# Patient Record
Sex: FEMALE | Race: WHITE | NOT HISPANIC OR LATINO
[De-identification: names, ages, dates, MRNs, and addresses within clinical notes are randomized per-mention and may not be internally consistent; named-entity substitution may affect disease eponyms.]

---

## 2020-10-18 ENCOUNTER — FORM ENCOUNTER (OUTPATIENT)
Age: 50
End: 2020-10-18

## 2021-08-24 PROBLEM — Z00.00 ENCOUNTER FOR PREVENTIVE HEALTH EXAMINATION: Status: ACTIVE | Noted: 2021-08-24

## 2021-09-13 DIAGNOSIS — Z78.9 OTHER SPECIFIED HEALTH STATUS: ICD-10-CM

## 2021-09-13 DIAGNOSIS — Z86.79 PERSONAL HISTORY OF OTHER DISEASES OF THE CIRCULATORY SYSTEM: ICD-10-CM

## 2021-09-13 DIAGNOSIS — Z80.1 FAMILY HISTORY OF MALIGNANT NEOPLASM OF TRACHEA, BRONCHUS AND LUNG: ICD-10-CM

## 2021-09-13 DIAGNOSIS — Z85.3 PERSONAL HISTORY OF MALIGNANT NEOPLASM OF BREAST: ICD-10-CM

## 2021-09-13 DIAGNOSIS — Z80.3 FAMILY HISTORY OF MALIGNANT NEOPLASM OF BREAST: ICD-10-CM

## 2021-09-14 ENCOUNTER — NON-APPOINTMENT (OUTPATIENT)
Age: 51
End: 2021-09-14

## 2021-09-14 ENCOUNTER — APPOINTMENT (OUTPATIENT)
Dept: BREAST CENTER | Facility: CLINIC | Age: 51
End: 2021-09-14
Payer: COMMERCIAL

## 2021-09-14 VITALS
HEIGHT: 67 IN | DIASTOLIC BLOOD PRESSURE: 87 MMHG | HEART RATE: 70 BPM | OXYGEN SATURATION: 98 % | SYSTOLIC BLOOD PRESSURE: 124 MMHG | BODY MASS INDEX: 26.68 KG/M2 | WEIGHT: 170 LBS

## 2021-09-14 PROCEDURE — 99213 OFFICE O/P EST LOW 20 MIN: CPT

## 2021-09-14 NOTE — ASSESSMENT
[FreeTextEntry1] : The patient is a 51-year-old  postmenopausal white female.  She underwent menarche at age 10 and had her first child at age 37.  She has a strong family history with her mother who had postmenopausal breast cancer as well as a maternal grandmother with postmenopausal breast cancer and a maternal aunt with premenopausal breast cancer.  The patient's sister underwent bilateral prophylactic mastectomies due to her high risk.  The patient herself was found to have calcifications in the 12:00 region of the right breast in  and stereotactic biopsy showed intermediate to high-grade DCIS which was ER/[place] positive.  Genetic testing in  was negative.  She decided undergo bilateral mastectomies and underwent a right breast total mastectomy and sentinel lymph node biopsy and left breast nipple sparing mastectomy with direct implant reconstruction on 2018.  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  The left side was benign.  She had 2 negative right axillary sentinel lymph nodes and 2 negative left axillary sentinel lymph nodes with 2 negative left axillary nonsentinel lymph nodes.  The right breast invasive cancer was ER borderline at 5%, [place] positive at 20%, and HER-2 positive by IHC making this a pathologic prognostic stage IA breast cancer.  She underwent chemotherapy through Dr. Toro with Herceptin.  She underwent a nipple reconstruction on the right.  She did well until she felt an area of recurrence in the lateral upper outer aspect of the right breast.  She underwent a chest wall excision of this recurrence on December 10, 2012 and had removal and exchange of the implant.  This recurrence was a 1.6 cm poorly differentiated invasive duct cancer which was ER/[place] strongly positive and HER-2 positive by IHC as well as FISH making this a recurrent pathologic prognostic stage IA breast cancer.  Metastatic survey showed no distant disease.  She underwent radiation over the right implant and did get chemotherapy again with AC followed by .  She developed significant capsular contracture over the right implant which was affecting her range of motion and underwent a capsulectomy with exchange of the implant on Constance 15, 2015 by Dr. Landers but developed an infection postoperatively and had her implant removed over the  weekend in .  She was on Zoladex and Arimidex and was followed by Dr. Gaffney but now sees a medical oncologist at [facility] and was taken off of Zoladex.  She underwent [facility] genetic panel testing in 2016 which was negative.  She did have a pigmented skin lesion on the right lateral chest wall which I biopsied in 2018 which is showed seborrheic keratosis and benign ductal epithelial cells.  On exam, she has significant right lateral chest wall and axillary scarring but no evidence of recurrence.  She should follow-up again in 1 year in 2022 and continues to follow-up at [facility] and remains on Arimidex.

## 2021-09-14 NOTE — PHYSICAL EXAM
[Normocephalic] : normocephalic [Atraumatic] : atraumatic [EOMI] : extra ocular movement intact [Supple] : supple [No Supraclavicular Adenopathy] : no supraclavicular adenopathy [No Cervical Adenopathy] : no cervical adenopathy [Examined in the supine and seated position] : examined in the supine and seated position [No dominant masses] : no dominant masses in right breast  [No dominant masses] : no dominant masses left breast [No Nipple Retraction] : no left nipple retraction [No Nipple Discharge] : no left nipple discharge [Breast Mass Right Breast ___cm] : no masses [Breast Nipple Inversion Left] : nipple not inverted [Breast Nipple Retraction Left] : nipple not retracted [Breast Nipple Flattening Left] : nipple not flattened [Breast Nipple Fissures Left] : nipple not fissured [Breast Abnormal Secretion Bloody Fluid Left] : no bloody discharge [Breast Abnormal Lactation (Galactorrhea) Left] : no galactorrhea [Breast Abnormal Secretion Serous Fluid Left] : no serous discharge [Breast Abnormal Secretion Opalescent Fluid Left] : no milky discharge [Breast Mass Left Breast ___cm] : no masses [No Axillary Lymphadenopathy] : no left axillary lymphadenopathy [No Edema] : no edema [No Rashes] : no rashes [No Ulceration] : no ulceration [de-identified] : On exam, the patient has the obvious bilateral mastectomies with no reconstruction on the right side and significant scarring towards her right axilla and lateral aspect of the right chest wall.  She has no suspicious findings over the left implant.  She has no axillary, supraclavicular, or cervical adenopathy.  She has no sensation in left nipple areolar complex.  She has good sensation on the superior, medial, and lateral poles of the left breast. [de-identified] : Status post mastectomy with previous implant which was removed secondary to contracture with significant scarring laterally and towards the axillary tail.  No evidence of recurrence. [de-identified] : Status post nipple sparing mastectomy with implant reconstruction with no suspicious findings. [de-identified] : Significant right axillary scarring

## 2021-09-14 NOTE — REASON FOR VISIT
[Follow-Up: _____] : a [unfilled] follow-up visit [FreeTextEntry1] : The patient comes in with a strong family history of breast cancer and a personal history of a right breast cancer diagnosed in 2008 which was a 1 cm poorly differentiated invasive duct cancer with multifocal DCIS for which she underwent a right breast total mastectomy and left breast prophylactic nipple sparing mastectomy with direct implant reconstruction and bilateral sentinel lymph node biopsies on April 16, 2008.  She ended up with 2 negative right axillary sentinel lymph nodes and 2 negative left axillary sentinel lymph nodes with 2 negative left axillary nonsentinel lymph nodes.  The right breast cancer was ER/[place] HER-2 positive (pathologic prognostic stage IA) and she underwent chemotherapy with Herceptin.  She later developed a recurrence over the right implant in 2012 and underwent a chest wall excision with exchange of the implant for 1.6 cm poorly differentiated invasive duct cancer again ER/IA HER-2 positive and she had radiation over the right implant and got chemotherapy with AC followed by .  She developed a significant capsular contracture over the right implant eventually requiring removal in 2015.  [facility] genetic panel testing was negative in 2016.  She has remained on Arimidex and now follows up at [facility] with medical oncology.  She comes in for routine yearly follow-up.

## 2021-09-14 NOTE — HISTORY OF PRESENT ILLNESS
[FreeTextEntry1] : The patient is a 51-year-old  postmenopausal white female.  She underwent menarche at age 10 and had her first child at age 37.  She has a strong family history with her mother who had postmenopausal breast cancer as well as a maternal grandmother with postmenopausal breast cancer and a maternal aunt with premenopausal breast cancer.  The patient's sister underwent bilateral prophylactic mastectomies due to her high risk.  The patient herself was found to have calcifications in the 12:00 region of the right breast in  and stereotactic biopsy showed intermediate to high-grade DCIS which was ER/NM positive.  Genetic testing in  was negative.  She decided undergo bilateral mastectomies and underwent a right breast total mastectomy and sentinel lymph node biopsy and left breast nipple sparing mastectomy with direct implant reconstruction on 2018.  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  The left side was benign.  She had 2 negative right axillary sentinel lymph nodes and 2 negative left axillary sentinel lymph nodes with 2 negative left axillary nonsentinel lymph nodes.  The right breast invasive cancer was ER borderline at 5%, NM positive at 20%, and HER-2 positive by IHC making this a pathologic prognostic stage IA breast cancer.  She underwent chemotherapy through Dr. Toro with Herceptin.  She underwent a nipple reconstruction on the right.  She did well until she felt an area of recurrence in the lateral upper outer aspect of the right breast.  She underwent a chest wall excision of this recurrence on December 10, 2012 and had removal and exchange of the implant.  This recurrence was a 1.6 cm poorly differentiated invasive duct cancer which was ER/NM strongly positive and HER-2 positive by IHC as well as FISH making this a recurrent pathologic prognostic stage IA breast cancer.  Metastatic survey showed no distant disease.  She underwent radiation over the right implant and did get chemotherapy again with AC followed by .  She developed significant capsular contracture over the right implant which was affecting her range of motion and underwent a capsulectomy with exchange of the implant on Constance 15, 2015 by Dr. Landers but developed an infection postoperatively and had her implant removed over the  weekend in .  She was on Zoladex and Arimidex and was followed by Dr. Gaffney but now sees a medical oncologist at [facility] and was taken off of Zoladex.  She underwent [facility] genetic panel testing in 2016 which was negative.  She did have a pigmented skin lesion on the right lateral chest wall which I biopsied in 2018 which is showed seborrheic keratosis and benign ductal epithelial cells.  She comes in now for routine yearly follow-up.

## 2022-09-20 NOTE — ASSESSMENT
[FreeTextEntry1] : The patient is a 52-year-old  postmenopausal white female.  She underwent menarche at age 10 and had her first child at age 37.  She has a strong family history with her mother who had postmenopausal breast cancer as well as a maternal grandmother with postmenopausal breast cancer and a maternal aunt with premenopausal breast cancer.  The patient's sister underwent bilateral prophylactic mastectomies due to her high risk.  The patient herself was found to have calcifications in the 12:00 region of the right breast in  and stereotactic biopsy showed intermediate to high-grade DCIS which was ER/[place] positive.  Genetic testing in  was negative.  She decided undergo bilateral mastectomies and underwent a right breast total mastectomy and sentinel lymph node biopsy and left breast nipple sparing mastectomy with direct to implant reconstruction on 2018.  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  The left side was benign.  She had 2 negative right axillary sentinel lymph nodes and 2 negative left axillary sentinel lymph nodes with 2 negative left axillary nonsentinel lymph nodes.  The right breast invasive cancer was ER borderline at 5%, [place] positive at 20%, and HER-2 positive by IHC making this a pathologic prognostic stage IA breast cancer.  She underwent chemotherapy through Dr. Toro with Herceptin.  She underwent a nipple reconstruction on the right.  She did well until she felt an area of recurrence in the lateral upper outer aspect of the right breast.  She underwent a chest wall excision of this recurrence on December 10, 2012 and had removal and exchange of the implant.  This recurrence was a 1.6 cm poorly differentiated invasive duct cancer which was ER/[place] strongly positive and HER-2 positive by IHC as well as FISH making this a recurrent pathologic prognostic stage IA breast cancer.  Metastatic survey showed no distant disease.  She underwent radiation over the right implant and did get chemotherapy again with AC followed by .  She developed significant capsular contracture over the right implant which was affecting her range of motion and underwent a capsulectomy with exchange of the implant on Constance 15, 2015 by Dr. Landers but developed an infection postoperatively and had her implant removed over the  weekend in .  She was on Zoladex and Arimidex and was followed by Dr. Gaffney but now sees a medical oncologist at [facility] and was taken off of Zoladex.  She underwent [facility] genetic panel testing in 2016 which was negative.  She did have a pigmented skin lesion on the right lateral chest wall which I biopsied in 2018 which is showed seborrheic keratosis and benign ductal epithelial cells.  On exam, she has significant right lateral chest wall and axillary scarring but no evidence of recurrence.  She should follow-up again in 1 year in 2023 and continues to follow-up at [facility] and remains on Arimidex.

## 2022-09-20 NOTE — PAST MEDICAL HISTORY
[Postmenopausal] : The patient is postmenopausal [Menarche Age ____] : age at menarche was [unfilled] [Menopause Age____] : age at menopause was [unfilled] [History of Hormone Replacement Treatment] : has no history of hormone replacement treatment [Total Preg ___] : G[unfilled] [Live Births ___] : P[unfilled]  [Premature ___] : Premature: [unfilled] [Age At Live Birth ___] : Age at live birth: [unfilled]

## 2022-09-20 NOTE — HISTORY OF PRESENT ILLNESS
[FreeTextEntry1] : The patient is a 52-year-old  postmenopausal white female.  She underwent menarche at age 10 and had her first child at age 37.  She has a strong family history with her mother who had postmenopausal breast cancer as well as a maternal grandmother with postmenopausal breast cancer and a maternal aunt with premenopausal breast cancer.  The patient's sister underwent bilateral prophylactic mastectomies due to her high risk.  The patient herself was found to have calcifications in the 12:00 region of the right breast in  and stereotactic biopsy showed intermediate to high-grade DCIS which was ER/NM positive.  Genetic testing in  was negative.  She decided undergo bilateral mastectomies and underwent a right breast total mastectomy and sentinel lymph node biopsy and left breast nipple sparing mastectomy with direct to implant reconstruction on 2018.  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  The left side was benign.  She had 2 negative right axillary sentinel lymph nodes and 2 negative left axillary sentinel lymph nodes with 2 negative left axillary nonsentinel lymph nodes.  The right breast invasive cancer was ER borderline at 5%, NM positive at 20%, and HER-2 positive by IHC making this a pathologic prognostic stage IA breast cancer.  She underwent chemotherapy through Dr. Toro with Herceptin.  She underwent a nipple reconstruction on the right.  She did well until she felt an area of recurrence in the lateral upper outer aspect of the right breast.  She underwent a chest wall excision of this recurrence on December 10, 2012 and had removal and exchange of the implant.  This recurrence was a 1.6 cm poorly differentiated invasive duct cancer which was ER/NM strongly positive and HER-2 positive by IHC as well as FISH making this a recurrent pathologic prognostic stage IA breast cancer.  Metastatic survey showed no distant disease.  She underwent radiation over the right implant and did get chemotherapy again with AC followed by .  She developed significant capsular contracture over the right implant which was affecting her range of motion and underwent a capsulectomy with exchange of the implant on Constance 15, 2015 by Dr. Landers but developed an infection postoperatively and had her implant removed over the  weekend in .  She was on Zoladex and Arimidex and was followed by Dr. Gaffney but now sees a medical oncologist at [facility] and was taken off of Zoladex.  She underwent [facility] genetic panel testing in 2016 which was negative.  She did have a pigmented skin lesion on the right lateral chest wall which I biopsied in 2018 which is showed seborrheic keratosis and benign ductal epithelial cells.  She comes in now for routine yearly follow-up.

## 2022-09-20 NOTE — REASON FOR VISIT
[Follow-Up: _____] : a [unfilled] follow-up visit [FreeTextEntry1] : The patient comes in with a strong family history of breast cancer and a personal history of a right breast cancer diagnosed in 2008 which was a 1 cm poorly differentiated invasive duct cancer with multifocal DCIS for which she underwent a right breast total mastectomy and left breast prophylactic nipple sparing mastectomy with direct implant reconstruction and bilateral sentinel lymph node biopsies on April 16, 2008.  She ended up with 2 negative right axillary sentinel lymph nodes and 2 negative left axillary sentinel lymph nodes with 2 negative left axillary nonsentinel lymph nodes.  The right breast cancer was ER/[place] HER-2 positive (pathologic prognostic stage IA) and she underwent chemotherapy with Herceptin.  She later developed a recurrence over the right implant in 2012 and underwent a chest wall excision with exchange of the implant for 1.6 cm poorly differentiated invasive duct cancer again ER/OK HER-2 positive and she had radiation over the right implant and got chemotherapy with AC followed by .  She developed a significant capsular contracture over the right implant eventually requiring removal in 2015.  [facility] genetic panel testing was negative in 2016.  She has remained on Arimidex and now follows up at [facility] with medical oncology.  She comes in for routine yearly follow-up.

## 2022-09-20 NOTE — PHYSICAL EXAM
[Normocephalic] : normocephalic [Atraumatic] : atraumatic [EOMI] : extra ocular movement intact [Supple] : supple [No Supraclavicular Adenopathy] : no supraclavicular adenopathy [No Cervical Adenopathy] : no cervical adenopathy [Examined in the supine and seated position] : examined in the supine and seated position [No dominant masses] : no dominant masses in right breast  [No dominant masses] : no dominant masses left breast [No Nipple Retraction] : no left nipple retraction [No Nipple Discharge] : no left nipple discharge [Breast Mass Right Breast ___cm] : no masses [Breast Nipple Inversion Left] : nipple not inverted [Breast Nipple Retraction Left] : nipple not retracted [Breast Nipple Flattening Left] : nipple not flattened [Breast Nipple Fissures Left] : nipple not fissured [Breast Abnormal Lactation (Galactorrhea) Left] : no galactorrhea [Breast Abnormal Secretion Bloody Fluid Left] : no bloody discharge [Breast Abnormal Secretion Serous Fluid Left] : no serous discharge [Breast Abnormal Secretion Opalescent Fluid Left] : no milky discharge [Breast Mass Left Breast ___cm] : no masses [No Axillary Lymphadenopathy] : no left axillary lymphadenopathy [No Edema] : no edema [No Rashes] : no rashes [No Ulceration] : no ulceration [de-identified] : On exam, the patient has the obvious bilateral mastectomies with no reconstruction on the right side and significant scarring towards her right axilla and lateral aspect of the right chest wall.  She has no suspicious findings over the left implant.  She has no axillary, supraclavicular, or cervical adenopathy.  She has no sensation in left nipple areolar complex.  She has good sensation on the superior, medial, and lateral poles of the left breast. [de-identified] : Status post mastectomy with previous implant which was removed secondary to contracture with significant scarring laterally and towards the axillary tail.  No evidence of recurrence. [de-identified] : Status post nipple sparing mastectomy with implant reconstruction with no suspicious findings. [de-identified] : Significant right axillary scarring

## 2022-09-27 ENCOUNTER — APPOINTMENT (OUTPATIENT)
Dept: BREAST CENTER | Facility: CLINIC | Age: 52
End: 2022-09-27

## 2022-10-05 NOTE — PHYSICAL EXAM
[Normocephalic] : normocephalic [Atraumatic] : atraumatic [EOMI] : extra ocular movement intact [Supple] : supple [No Supraclavicular Adenopathy] : no supraclavicular adenopathy [No Cervical Adenopathy] : no cervical adenopathy [Examined in the supine and seated position] : examined in the supine and seated position [No dominant masses] : no dominant masses in right breast  [No dominant masses] : no dominant masses left breast [No Nipple Retraction] : no left nipple retraction [No Nipple Discharge] : no left nipple discharge [Breast Mass Right Breast ___cm] : no masses [Breast Nipple Inversion Left] : nipple not inverted [Breast Nipple Retraction Left] : nipple not retracted [Breast Nipple Flattening Left] : nipple not flattened [Breast Nipple Fissures Left] : nipple not fissured [Breast Abnormal Lactation (Galactorrhea) Left] : no galactorrhea [Breast Abnormal Secretion Bloody Fluid Left] : no bloody discharge [Breast Abnormal Secretion Serous Fluid Left] : no serous discharge [Breast Abnormal Secretion Opalescent Fluid Left] : no milky discharge [Breast Mass Left Breast ___cm] : no masses [No Axillary Lymphadenopathy] : no left axillary lymphadenopathy [No Edema] : no edema [No Rashes] : no rashes [No Ulceration] : no ulceration [de-identified] : On exam, the patient has the obvious bilateral mastectomies with no reconstruction on the right side and significant scarring towards her right axilla and lateral aspect of the right chest wall.  She has no suspicious findings over the left implant.  She has no axillary, supraclavicular, or cervical adenopathy.  She has no sensation in left nipple areolar complex.  She has good sensation on the superior, medial, and lateral poles of the left breast. [de-identified] : Status post mastectomy with previous implant which was removed secondary to contracture with significant scarring laterally and towards the axillary tail.  No evidence of recurrence. [de-identified] : Status post nipple sparing mastectomy with implant reconstruction with no suspicious findings. [de-identified] : Significant right axillary scarring

## 2022-10-05 NOTE — REASON FOR VISIT
[Follow-Up: _____] : a [unfilled] follow-up visit [FreeTextEntry1] : The patient comes in with a strong family history of breast cancer and a personal history of a right breast cancer diagnosed in 2008 which was a 1 cm poorly differentiated invasive duct cancer with multifocal DCIS for which she underwent a right breast total mastectomy and left breast prophylactic nipple sparing mastectomy with direct implant reconstruction and bilateral sentinel lymph node biopsies on April 16, 2008.  She ended up with 2 negative right axillary sentinel lymph nodes and 2 negative left axillary sentinel lymph nodes with 2 negative left axillary nonsentinel lymph nodes.  The right breast cancer was ER/[place] HER-2 positive (pathologic prognostic stage IA) and she underwent chemotherapy with Herceptin.  She later developed a recurrence over the right implant in 2012 and underwent a chest wall excision with exchange of the implant for 1.6 cm poorly differentiated invasive duct cancer again ER/[place] HER-2 positive and she had radiation over the right implant and got chemotherapy with AC followed by .  She developed a significant capsular contracture over the right implant eventually requiring removal in 2015.  [facility] genetic panel testing was negative in 2016.  She has remained on Arimidex and now follows up at [facility] with medical oncology.  She comes in for routine yearly follow-up.

## 2022-10-05 NOTE — ASSESSMENT
[FreeTextEntry1] : The patient is a 52-year-old  postmenopausal white female.  She underwent menarche at age 10 and had her first child at age 37.  She has a strong family history with her mother who had postmenopausal breast cancer as well as a maternal grandmother with postmenopausal breast cancer and a maternal aunt with premenopausal breast cancer.  The patient's sister underwent bilateral prophylactic mastectomies due to her high risk.  The patient herself was found to have calcifications in the 12:00 region of the right breast in  and stereotactic biopsy showed intermediate to high-grade DCIS which was ER/CT positive.  Genetic testing in  was negative.  She decided undergo bilateral mastectomies and underwent a right breast total mastectomy and sentinel lymph node biopsy and left breast nipple sparing mastectomy with direct to implant reconstruction on 2008.  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  The left side was benign.  She had 2 negative right axillary sentinel lymph nodes and 2 negative left axillary sentinel lymph nodes with 2 negative left axillary nonsentinel lymph nodes.  The right breast invasive cancer was ER borderline at 5%, CT positive at 20%, and HER-2 positive by IHC making this a pathologic prognostic stage IA breast cancer.  She underwent chemotherapy through Dr. Toro with Herceptin.  She underwent a nipple reconstruction on the right.  She did well until she felt an area of recurrence in the lateral upper outer aspect of the right breast.  She underwent a chest wall excision of this recurrence on December 10, 2012 and had removal and exchange of the implant.  This recurrence was a 1.6 cm poorly differentiated invasive duct cancer which was ER/CT strongly positive and HER-2 positive by IHC as well as FISH making this a recurrent pathologic prognostic stage IA breast cancer.  Metastatic survey showed no distant disease.  She underwent radiation over the right implant and did get chemotherapy again with AC followed by .  She developed significant capsular contracture over the right implant which was affecting her range of motion and underwent a capsulectomy with exchange of the implant on [place] 15, 2015 by Dr. Landers but developed an infection postoperatively and had her implant removed over the  weekend in .  She was on Zoladex and Arimidex and was followed by Dr. Gaffney but now sees a medical oncologist at [facility] and was taken off of Zoladex.  She underwent [facility] genetic panel testing in 2016 which was negative.  She did have a pigmented skin lesion on the right lateral chest wall which I biopsied in 2018 which showed a seborrheic keratosis and benign ductal epithelial cells.  On exam, she has significant right lateral chest wall and axillary scarring but no evidence of recurrence.  She should follow-up again in 1 year in 2023 and continues to follow-up at [facility] and remains on Arimidex.

## 2022-10-05 NOTE — HISTORY OF PRESENT ILLNESS
[FreeTextEntry1] : The patient is a 52-year-old  postmenopausal white female.  She underwent menarche at age 10 and had her first child at age 37.  She has a strong family history with her mother who had postmenopausal breast cancer as well as a maternal grandmother with postmenopausal breast cancer and a maternal aunt with premenopausal breast cancer.  The patient's sister underwent bilateral prophylactic mastectomies due to her high risk.  The patient herself was found to have calcifications in the 12:00 region of the right breast in  and stereotactic biopsy showed intermediate to high-grade DCIS which was ER/MA positive.  Genetic testing in  was negative.  She decided undergo bilateral mastectomies and underwent a right breast total mastectomy and sentinel lymph node biopsy and left breast nipple sparing mastectomy with direct to implant reconstruction on 2008.  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  The left side was benign.  She had 2 negative right axillary sentinel lymph nodes and 2 negative left axillary sentinel lymph nodes with 2 negative left axillary nonsentinel lymph nodes.  The right breast invasive cancer was ER borderline at 5%, MA positive at 20%, and HER-2 positive by IHC making this a pathologic prognostic stage IA breast cancer.  She underwent chemotherapy through Dr. Toro with Herceptin.  She underwent a nipple reconstruction on the right.  She did well until she felt an area of recurrence in the lateral upper outer aspect of the right breast.  She underwent a chest wall excision of this recurrence on December 10, 2012 and had removal and exchange of the implant.  This recurrence was a 1.6 cm poorly differentiated invasive duct cancer which was ER/MA strongly positive and HER-2 positive by IHC as well as FISH making this a recurrent pathologic prognostic stage IA breast cancer.  Metastatic survey showed no distant disease.  She underwent radiation over the right implant and did get chemotherapy again with AC followed by .  She developed significant capsular contracture over the right implant which was affecting her range of motion and underwent a capsulectomy with exchange of the implant on Con[place] 15, 2015 by Dr. Landers but developed an infection postoperatively and had her implant removed over the  weekend in .  She was on Zoladex and Arimidex and was followed by Dr. Gaffney but now sees a medical oncologist at [facility] and was taken off of Zoladex.  She underwent [facility] genetic panel testing in 2016 which was negative.  She did have a pigmented skin lesion on the right lateral chest wall which I biopsied in 2018 which is showed seborrheic keratosis and benign ductal epithelial cells.  She comes in now for routine yearly follow-up.

## 2022-10-12 ENCOUNTER — APPOINTMENT (OUTPATIENT)
Dept: BREAST CENTER | Facility: CLINIC | Age: 52
End: 2022-10-12

## 2022-11-01 NOTE — REASON FOR VISIT
[Follow-Up: _____] : a [unfilled] follow-up visit [FreeTextEntry1] : The patient comes in with a strong family history of breast cancer and a personal history of a right breast cancer diagnosed in 2008 which was a 1 cm poorly differentiated invasive duct cancer with multifocal DCIS for which she underwent a right breast total mastectomy and left breast prophylactic nipple sparing mastectomy with direct implant reconstruction and bilateral sentinel lymph node biopsies on April 16, 2008.  She ended up with 2 negative right axillary sentinel lymph nodes and 2 negative left axillary sentinel lymph nodes with 2 negative left axillary nonsentinel lymph nodes.  The right breast cancer was ER/TX HER-2 positive (pathologic prognostic stage IA) and she underwent chemotherapy with Herceptin.  She later developed a recurrence over the right implant in 2012 and underwent a chest wall excision with exchange of the implant for 1.6 cm poorly differentiated invasive duct cancer again ER/TX HER-2 positive and she had radiation over the right implant and got chemotherapy with AC followed by .  She developed a significant capsular contracture over the right implant eventually requiring removal in 2015.  [facility] genetic panel testing was negative in 2016.  She has remained on Arimidex and now follows up at [facility] with medical oncology.  She comes in for routine yearly follow-up.

## 2022-11-01 NOTE — ASSESSMENT
[FreeTextEntry1] : The patient is a 52-year-old  postmenopausal white female.  She underwent menarche at age 10 and had her first child at age 37.  She has a strong family history with her mother who had postmenopausal breast cancer as well as a maternal grandmother with postmenopausal breast cancer and a maternal aunt with premenopausal breast cancer.  The patient's sister underwent bilateral prophylactic mastectomies due to her high risk.  The patient herself was found to have calcifications in the 12:00 region of the right breast in  and stereotactic biopsy showed intermediate to high-grade DCIS which was ER/TN positive.  Genetic testing in  was negative.  She decided undergo bilateral mastectomies and underwent a right breast total mastectomy and sentinel lymph node biopsy and left breast nipple sparing mastectomy with direct to implant reconstruction on 2008.  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  The left side was benign.  She had 2 negative right axillary sentinel lymph nodes and 2 negative left axillary sentinel lymph nodes with 2 negative left axillary nonsentinel lymph nodes.  The right breast invasive cancer was ER borderline at 5%, TN positive at 20%, and HER-2 positive by IHC making this a pathologic prognostic stage IA breast cancer.  She underwent chemotherapy through Dr. Toro with Herceptin.  She underwent a nipple reconstruction on the right.  She did well until she felt an area of recurrence in the lateral upper outer aspect of the right breast.  She underwent a chest wall excision of this recurrence on December 10, 2012 and had removal and exchange of the implant.  This recurrence was a 1.6 cm poorly differentiated invasive duct cancer which was ER/TN strongly positive and HER-2 positive by IHC as well as FISH making this a recurrent pathologic prognostic stage IA breast cancer.  Metastatic survey showed no distant disease.  She underwent radiation over the right implant and did get chemotherapy again with AC followed by .  She developed significant capsular contracture over the right implant which was affecting her range of motion and underwent a capsulectomy with exchange of the implant on [place] 15, 2015 by Dr. Landers but developed an infection postoperatively and had her implant removed over the  weekend in .  She was on Zoladex and Arimidex and was followed by Dr. Gaffney but now sees a medical oncologist at [facility] and was taken off of Zoladex.  She underwent [facility] genetic panel testing in 2016 which was negative.  She did have a pigmented skin lesion on the right lateral chest wall which I biopsied in 2018 which showed a seborrheic keratosis and benign ductal epithelial cells.  On exam, she has significant right lateral chest wall and axillary scarring but no evidence of recurrence.  She should follow-up again in 1 year in 2023 and continues to follow-up at [facility] and remains on Arimidex.

## 2022-11-01 NOTE — PHYSICAL EXAM
[Normocephalic] : normocephalic [Atraumatic] : atraumatic [EOMI] : extra ocular movement intact [Supple] : supple [No Supraclavicular Adenopathy] : no supraclavicular adenopathy [No Cervical Adenopathy] : no cervical adenopathy [Examined in the supine and seated position] : examined in the supine and seated position [No dominant masses] : no dominant masses in right breast  [No dominant masses] : no dominant masses left breast [No Nipple Retraction] : no left nipple retraction [No Nipple Discharge] : no left nipple discharge [Breast Mass Right Breast ___cm] : no masses [Breast Nipple Inversion Left] : nipple not inverted [Breast Nipple Retraction Left] : nipple not retracted [Breast Nipple Flattening Left] : nipple not flattened [Breast Abnormal Lactation (Galactorrhea) Left] : no galactorrhea [Breast Nipple Fissures Left] : nipple not fissured [Breast Abnormal Secretion Serous Fluid Left] : no serous discharge [Breast Abnormal Secretion Bloody Fluid Left] : no bloody discharge [Breast Abnormal Secretion Opalescent Fluid Left] : no milky discharge [Breast Mass Left Breast ___cm] : no masses [No Axillary Lymphadenopathy] : no left axillary lymphadenopathy [No Edema] : no edema [No Rashes] : no rashes [No Ulceration] : no ulceration [de-identified] : On exam, the patient has the obvious bilateral mastectomies with no reconstruction on the right side and significant scarring towards her right axilla and lateral aspect of the right chest wall.  She has no suspicious findings over the left implant.  She has no axillary, supraclavicular, or cervical adenopathy.  She has no sensation in left nipple areolar complex.  She has good sensation on the superior, medial, and lateral poles of the left breast. [de-identified] : Status post mastectomy with previous implant which was removed secondary to contracture with significant scarring laterally and towards the axillary tail.  No evidence of recurrence. [de-identified] : Status post nipple sparing mastectomy with implant reconstruction with no suspicious findings. [de-identified] : Significant right axillary scarring

## 2022-11-01 NOTE — HISTORY OF PRESENT ILLNESS
[FreeTextEntry1] : The patient is a 52-year-old  postmenopausal white female.  She underwent menarche at age 10 and had her first child at age 37.  She has a strong family history with her mother who had postmenopausal breast cancer as well as a maternal grandmother with postmenopausal breast cancer and a maternal aunt with premenopausal breast cancer.  The patient's sister underwent bilateral prophylactic mastectomies due to her high risk.  The patient herself was found to have calcifications in the 12:00 region of the right breast in  and stereotactic biopsy showed intermediate to high-grade DCIS which was ER/IA positive.  Genetic testing in  was negative.  She decided undergo bilateral mastectomies and underwent a right breast total mastectomy and sentinel lymph node biopsy and left breast nipple sparing mastectomy with direct to implant reconstruction on 2008.  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  The left side was benign.  She had 2 negative right axillary sentinel lymph nodes and 2 negative left axillary sentinel lymph nodes with 2 negative left axillary nonsentinel lymph nodes.  The right breast invasive cancer was ER borderline at 5%, IA positive at 20%, and HER-2 positive by IHC making this a pathologic prognostic stage IA breast cancer.  She underwent chemotherapy through Dr. Toro with Herceptin.  She underwent a nipple reconstruction on the right.  She did well until she felt an area of recurrence in the lateral upper outer aspect of the right breast.  She underwent a chest wall excision of this recurrence on December 10, 2012 and had removal and exchange of the implant.  This recurrence was a 1.6 cm poorly differentiated invasive duct cancer which was ER/IA strongly positive and HER-2 positive by IHC as well as FISH making this a recurrent pathologic prognostic stage IA breast cancer.  Metastatic survey showed no distant disease.  She underwent radiation over the right implant and did get chemotherapy again with AC followed by .  She developed significant capsular contracture over the right implant which was affecting her range of motion and underwent a capsulectomy with exchange of the implant on [place] 15, 2015 by Dr. Landers but developed an infection postoperatively and had her implant removed over the  weekend in .  She was on Zoladex and Arimidex and was followed by Dr. Gaffney but now sees a medical oncologist at [facility] and was taken off of Zoladex.  She underwent [facility] genetic panel testing in 2016 which was negative.  She did have a pigmented skin lesion on the right lateral chest wall which I biopsied in 2018 which is showed seborrheic keratosis and benign ductal epithelial cells.  She comes in now for routine yearly follow-up.

## 2022-11-08 ENCOUNTER — APPOINTMENT (OUTPATIENT)
Dept: BREAST CENTER | Facility: CLINIC | Age: 52
End: 2022-11-08

## 2022-11-09 ENCOUNTER — APPOINTMENT (OUTPATIENT)
Dept: BREAST CENTER | Facility: CLINIC | Age: 52
End: 2022-11-09

## 2022-11-09 VITALS
BODY MASS INDEX: 28.09 KG/M2 | HEART RATE: 75 BPM | SYSTOLIC BLOOD PRESSURE: 135 MMHG | HEIGHT: 67 IN | OXYGEN SATURATION: 98 % | DIASTOLIC BLOOD PRESSURE: 79 MMHG | WEIGHT: 179 LBS

## 2022-11-09 PROCEDURE — 99213 OFFICE O/P EST LOW 20 MIN: CPT

## 2022-11-09 NOTE — HISTORY OF PRESENT ILLNESS
[FreeTextEntry1] : The patient is a 52-year-old  postmenopausal white female.  She underwent menarche at age 10 and had her first child at age 37.  She has a strong family history with her mother who had postmenopausal breast cancer as well as a maternal grandmother with postmenopausal breast cancer and a maternal aunt with premenopausal breast cancer.  The patient's sister underwent bilateral prophylactic mastectomies due to her high risk.  The patient herself was found to have calcifications in the 12:00 region of the right breast in  and stereotactic biopsy showed intermediate to high-grade DCIS which was ER/OK positive.  Genetic testing in  was negative.  She decided undergo bilateral mastectomies and underwent a right breast total mastectomy and sentinel lymph node biopsy and left breast nipple sparing mastectomy with direct to implant reconstruction on 2008.  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  The left side was benign.  She had 2 negative right axillary sentinel lymph nodes and 2 negative left axillary sentinel lymph nodes with 2 negative left axillary nonsentinel lymph nodes.  The right breast invasive cancer was ER borderline at 5%, OK positive at 20%, and HER-2 positive by IHC making this a pathologic prognostic stage IA breast cancer.  She underwent chemotherapy through Dr. Toro with Herceptin.  She underwent a nipple reconstruction on the right.  She did well until she felt an area of recurrence in the lateral upper outer aspect of the right breast.  She underwent a chest wall excision of this recurrence on December 10, 2012 and had removal and exchange of the implant.  This recurrence was a 1.6 cm poorly differentiated invasive duct cancer which was ER/OK strongly positive and HER-2 positive by IHC as well as FISH making this a recurrent pathologic prognostic stage IA breast cancer.  Metastatic survey showed no distant disease.  She underwent radiation over the right implant and did get chemotherapy again with AC followed by .  She developed significant capsular contracture over the right implant which was affecting her range of motion and underwent a capsulectomy with exchange of the implant on [place] 15, 2015 by Dr. Landers but developed an infection postoperatively and had her implant removed over the  weekend in .  She was on Zoladex and Arimidex and was followed by Dr. Gaffney but now sees a medical oncologist at [facility] and was taken off of Zoladex.  She underwent [facility] genetic panel testing in 2016 which was negative.  She did have a pigmented skin lesion on the right lateral chest wall which I biopsied in 2018 which is showed seborrheic keratosis and benign ductal epithelial cells.  She comes in now for routine yearly follow-up.

## 2022-11-09 NOTE — PHYSICAL EXAM
[Normocephalic] : normocephalic [Atraumatic] : atraumatic [EOMI] : extra ocular movement intact [Supple] : supple [No Supraclavicular Adenopathy] : no supraclavicular adenopathy [No Cervical Adenopathy] : no cervical adenopathy [Examined in the supine and seated position] : examined in the supine and seated position [No dominant masses] : no dominant masses in right breast  [No dominant masses] : no dominant masses left breast [No Nipple Retraction] : no left nipple retraction [No Nipple Discharge] : no left nipple discharge [Breast Mass Right Breast ___cm] : no masses [Breast Mass Left Breast ___cm] : no masses [No Axillary Lymphadenopathy] : no left axillary lymphadenopathy [No Edema] : no edema [No Rashes] : no rashes [No Ulceration] : no ulceration [Breast Nipple Inversion Left] : nipple not inverted [Breast Nipple Retraction Left] : nipple not retracted [Breast Nipple Flattening Left] : nipple not flattened [Breast Nipple Fissures Left] : nipple not fissured [Breast Abnormal Lactation (Galactorrhea) Left] : no galactorrhea [Breast Abnormal Secretion Bloody Fluid Left] : no bloody discharge [Breast Abnormal Secretion Serous Fluid Left] : no serous discharge [Breast Abnormal Secretion Opalescent Fluid Left] : no milky discharge [de-identified] : On exam, the patient has the obvious bilateral mastectomies with no reconstruction on the right side and significant scarring towards her right axilla and lateral aspect of the right chest wall.  She has no suspicious findings over the left implant.  She has no axillary, supraclavicular, or cervical adenopathy.  She has no sensation in left nipple areolar complex.  She has good sensation on the superior, medial, and lateral poles of the left breast. [de-identified] : Status post mastectomy with previous implant which was removed secondary to contracture with significant scarring laterally and towards the axillary tail.  No evidence of recurrence. [de-identified] : Status post nipple sparing mastectomy with implant reconstruction with no suspicious findings. [de-identified] : Significant right axillary scarring

## 2022-11-09 NOTE — ASSESSMENT
[FreeTextEntry1] : The patient is a 52-year-old  postmenopausal white female.  She underwent menarche at age 10 and had her first child at age 37.  She has a strong family history with her mother who had postmenopausal breast cancer as well as a maternal grandmother with postmenopausal breast cancer and a maternal aunt with premenopausal breast cancer.  The patient's sister underwent bilateral prophylactic mastectomies due to her high risk.  The patient herself was found to have calcifications in the 12:00 region of the right breast in  and stereotactic biopsy showed intermediate to high-grade DCIS which was ER/VA positive.  Genetic testing in  was negative.  She decided undergo bilateral mastectomies and underwent a right breast total mastectomy and sentinel lymph node biopsy and left breast nipple sparing mastectomy with direct to implant reconstruction on 2008.  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  The left side was benign.  She had 2 negative right axillary sentinel lymph nodes and 2 negative left axillary sentinel lymph nodes with 2 negative left axillary nonsentinel lymph nodes.  The right breast invasive cancer was ER borderline at 5%, VA positive at 20%, and HER-2 positive by IHC making this a pathologic prognostic stage IA breast cancer.  She underwent chemotherapy through Dr. Toro with Herceptin.  She underwent a nipple reconstruction on the right.  She did well until she felt an area of recurrence in the lateral upper outer aspect of the right breast.  She underwent a chest wall excision of this recurrence on December 10, 2012 and had removal and exchange of the implant.  This recurrence was a 1.6 cm poorly differentiated invasive duct cancer which was ER/VA strongly positive and HER-2 positive by IHC as well as FISH making this a recurrent pathologic prognostic stage IA breast cancer.  Metastatic survey showed no distant disease.  She underwent radiation over the right implant and did get chemotherapy again with AC followed by .  She developed significant capsular contracture over the right implant which was affecting her range of motion and underwent a capsulectomy with exchange of the implant on [place] 15, 2015 by Dr. Landers but developed an infection postoperatively and had her implant removed over the  weekend in .  She was on Zoladex and Arimidex and was followed by Dr. Gaffney but now sees a medical oncologist at [facility] and was taken off of Zoladex.  She underwent [facility] genetic panel testing in 2016 which was negative.  She did have a pigmented skin lesion on the right lateral chest wall which I biopsied in 2018 which showed a seborrheic keratosis and benign ductal epithelial cells.  On exam, she has significant right lateral chest wall and axillary scarring but no evidence of recurrence.  She should follow-up again in 1 year in 2023 and continues to follow-up at [facility] and remains on Arimidex.

## 2022-11-09 NOTE — REASON FOR VISIT
[Follow-Up: _____] : a [unfilled] follow-up visit [FreeTextEntry1] : The patient comes in with a strong family history of breast cancer and a personal history of a right breast cancer diagnosed in 2008 which was a 1 cm poorly differentiated invasive duct cancer with multifocal DCIS for which she underwent a right breast total mastectomy and left breast prophylactic nipple sparing mastectomy with direct implant reconstruction and bilateral sentinel lymph node biopsies on April 16, 2008.  She ended up with 2 negative right axillary sentinel lymph nodes and 2 negative left axillary sentinel lymph nodes with 2 negative left axillary nonsentinel lymph nodes.  The right breast cancer was ER/[place] HER-2 positive (pathologic prognostic stage IA) and she underwent chemotherapy with Herceptin.  She later developed a recurrence over the right implant in 2012 and underwent a chest wall excision with exchange of the implant for 1.6 cm poorly differentiated invasive duct cancer again ER/OH HER-2 positive and she had radiation over the right implant and got chemotherapy with AC followed by .  She developed a significant capsular contracture over the right implant eventually requiring removal in 2015.  [facility] genetic panel testing was negative in 2016.  She has remained on Arimidex and now follows up at [facility] with medical oncology.  She comes in for routine yearly follow-up.

## 2022-11-09 NOTE — PAST MEDICAL HISTORY
[Postmenopausal] : The patient is postmenopausal [Menarche Age ____] : age at menarche was [unfilled] [Menopause Age____] : age at menopause was [unfilled] [Total Preg ___] : G[unfilled] [Live Births ___] : P[unfilled]  [Premature ___] : Premature: [unfilled] [Age At Live Birth ___] : Age at live birth: [unfilled] [History of Hormone Replacement Treatment] : has no history of hormone replacement treatment

## 2023-11-05 ENCOUNTER — NON-APPOINTMENT (OUTPATIENT)
Age: 53
End: 2023-11-05

## 2023-11-14 ENCOUNTER — APPOINTMENT (OUTPATIENT)
Dept: BREAST CENTER | Facility: CLINIC | Age: 53
End: 2023-11-14
Payer: COMMERCIAL

## 2023-11-14 VITALS
HEART RATE: 73 BPM | DIASTOLIC BLOOD PRESSURE: 78 MMHG | HEIGHT: 67 IN | OXYGEN SATURATION: 96 % | SYSTOLIC BLOOD PRESSURE: 125 MMHG | WEIGHT: 180 LBS | BODY MASS INDEX: 28.25 KG/M2

## 2023-11-14 DIAGNOSIS — Z80.3 FAMILY HISTORY OF MALIGNANT NEOPLASM OF BREAST: ICD-10-CM

## 2023-11-14 DIAGNOSIS — Z85.3 PERSONAL HISTORY OF MALIGNANT NEOPLASM OF BREAST: ICD-10-CM

## 2023-11-14 DIAGNOSIS — Z90.13 ACQUIRED ABSENCE OF BILATERAL BREASTS AND NIPPLES: ICD-10-CM

## 2023-11-14 PROCEDURE — 99213 OFFICE O/P EST LOW 20 MIN: CPT

## 2024-10-30 ENCOUNTER — NON-APPOINTMENT (OUTPATIENT)
Age: 54
End: 2024-10-30

## 2024-11-21 ENCOUNTER — APPOINTMENT (OUTPATIENT)
Dept: BREAST CENTER | Facility: CLINIC | Age: 54
End: 2024-11-21
Payer: COMMERCIAL

## 2024-11-21 VITALS
HEART RATE: 69 BPM | OXYGEN SATURATION: 97 % | WEIGHT: 167 LBS | DIASTOLIC BLOOD PRESSURE: 69 MMHG | HEIGHT: 67 IN | BODY MASS INDEX: 26.21 KG/M2 | SYSTOLIC BLOOD PRESSURE: 111 MMHG

## 2024-11-21 DIAGNOSIS — Z12.39 ENCOUNTER FOR OTHER SCREENING FOR MALIGNANT NEOPLASM OF BREAST: ICD-10-CM

## 2024-11-21 DIAGNOSIS — Z90.13 ACQUIRED ABSENCE OF BILATERAL BREASTS AND NIPPLES: ICD-10-CM

## 2024-11-21 DIAGNOSIS — Z85.3 PERSONAL HISTORY OF MALIGNANT NEOPLASM OF BREAST: ICD-10-CM

## 2024-11-21 DIAGNOSIS — Z80.3 FAMILY HISTORY OF MALIGNANT NEOPLASM OF BREAST: ICD-10-CM

## 2024-11-21 PROCEDURE — 99213 OFFICE O/P EST LOW 20 MIN: CPT
